# Patient Record
Sex: MALE | Race: OTHER
[De-identification: names, ages, dates, MRNs, and addresses within clinical notes are randomized per-mention and may not be internally consistent; named-entity substitution may affect disease eponyms.]

---

## 2018-11-29 ENCOUNTER — HOSPITAL ENCOUNTER (INPATIENT)
Dept: HOSPITAL 54 - ER | Age: 33
LOS: 1 days | Discharge: HOME | DRG: 343 | End: 2018-11-30
Attending: NURSE PRACTITIONER | Admitting: NURSE PRACTITIONER
Payer: COMMERCIAL

## 2018-11-29 VITALS — WEIGHT: 187 LBS | BODY MASS INDEX: 26.18 KG/M2 | HEIGHT: 71 IN

## 2018-11-29 DIAGNOSIS — K35.30: Primary | ICD-10-CM

## 2018-11-29 DIAGNOSIS — F90.9: ICD-10-CM

## 2018-11-29 LAB
ALBUMIN SERPL BCP-MCNC: 4.3 G/DL (ref 3.4–5)
ALP SERPL-CCNC: 110 U/L (ref 46–116)
ALT SERPL W P-5'-P-CCNC: 61 U/L (ref 12–78)
AST SERPL W P-5'-P-CCNC: 19 U/L (ref 15–37)
BASOPHILS # BLD AUTO: 0.1 /CMM (ref 0–0.2)
BASOPHILS NFR BLD AUTO: 0.5 % (ref 0–2)
BILIRUB DIRECT SERPL-MCNC: 0.2 MG/DL (ref 0–0.2)
BILIRUB SERPL-MCNC: 1 MG/DL (ref 0.2–1)
BILIRUB UR QL STRIP: (no result)
BUN SERPL-MCNC: 10 MG/DL (ref 7–18)
CALCIUM SERPL-MCNC: 8.7 MG/DL (ref 8.5–10.1)
CHLORIDE SERPL-SCNC: 103 MMOL/L (ref 98–107)
CO2 SERPL-SCNC: 29 MMOL/L (ref 21–32)
CREAT SERPL-MCNC: 1.1 MG/DL (ref 0.6–1.3)
EOSINOPHIL NFR BLD AUTO: 1.4 % (ref 0–6)
GLUCOSE SERPL-MCNC: 86 MG/DL (ref 74–106)
HCT VFR BLD AUTO: 46 % (ref 39–51)
HGB BLD-MCNC: 16.8 G/DL (ref 13.5–17.5)
LYMPHOCYTES NFR BLD AUTO: 19 % (ref 20–44)
LYMPHOCYTES NFR BLD AUTO: 2.1 /CMM (ref 0.8–4.8)
MCHC RBC AUTO-ENTMCNC: 37 G/DL (ref 31–36)
MCV RBC AUTO: 90 FL (ref 80–96)
MONOCYTES NFR BLD AUTO: 1 /CMM (ref 0.1–1.3)
MONOCYTES NFR BLD AUTO: 9.5 % (ref 2–12)
NEUTROPHILS # BLD AUTO: 7.6 /CMM (ref 1.8–8.9)
NEUTROPHILS NFR BLD AUTO: 69.6 % (ref 43–81)
PH UR STRIP: 7.5 [PH] (ref 5–8)
PLATELET # BLD AUTO: 236 /CMM (ref 150–450)
POTASSIUM SERPL-SCNC: 3.8 MMOL/L (ref 3.5–5.1)
PROT SERPL-MCNC: 8 G/DL (ref 6.4–8.2)
RBC # BLD AUTO: 5.15 MIL/UL (ref 4.5–6)
RBC #/AREA URNS HPF: (no result) /HPF (ref 0–2)
SODIUM SERPL-SCNC: 140 MMOL/L (ref 136–145)
UROBILINOGEN UR STRIP-MCNC: 1 EU/DL
WBC #/AREA URNS HPF: (no result) /HPF (ref 0–3)
WBC NRBC COR # BLD AUTO: 10.9 K/UL (ref 4.3–11)

## 2018-11-29 PROCEDURE — A6402 STERILE GAUZE <= 16 SQ IN: HCPCS

## 2018-11-29 PROCEDURE — G0378 HOSPITAL OBSERVATION PER HR: HCPCS

## 2018-11-29 PROCEDURE — A4606 OXYGEN PROBE USED W OXIMETER: HCPCS

## 2018-11-29 PROCEDURE — Z7610: HCPCS

## 2018-11-29 PROCEDURE — C9113 INJ PANTOPRAZOLE SODIUM, VIA: HCPCS

## 2018-11-29 RX ADMIN — HYDROMORPHONE HYDROCHLORIDE PRN MG: 2 INJECTION, SOLUTION INTRAMUSCULAR; INTRAVENOUS; SUBCUTANEOUS at 22:54

## 2018-11-29 NOTE — NUR
RN ADMITTING NOTES



PATIENT BROUGHT INTO THE UNIT VIA GURNEY, ALERT AND ORIENTED X 4, AMBULATORY. PT NOTED WITH 
NO SOB, BREATHING EVEN AND UNLABORED, DENIES NAUSEA, NO EPISODES OF VOMITING THROUGHOUT THE 
DAY, NO DIZZINESS. PT WITH C/O ABDOMINAL PAIN, 6/10 AT THIS TIME. PT VERBALIZED THAT HE 
WOULD LIKE TO EAT FIRST BEFORE GETTING ANY PAIN MEDICATION. ORIENTED PT TO UNIT, ADMISSION 
PROCESS, CALL LIGHT AND USE OF CALL LIGHT AND PT VERBALIZED UNDERSTANDING. PRE-OP TEACHING 
GIVEN, PT UNDERSTANDS AND SAID HE SPOKE WITH THE DOCTOR WHILE HE WAS IN THE ER. ALL 
PATIENT'S NEEDS ATTENDED TO AT THIS TIME. REFUSED FLU VACCINATION. PLACED CALL LIGHT WITHIN 
EASY REACH, BED IN LOW POSITION AND LOCKED IN PLACE. PT WITH GOOD SAFETY AWARENESS. WILL 
CONTINUE TO MONITOR.

## 2018-11-30 VITALS — SYSTOLIC BLOOD PRESSURE: 111 MMHG | DIASTOLIC BLOOD PRESSURE: 68 MMHG

## 2018-11-30 VITALS — SYSTOLIC BLOOD PRESSURE: 110 MMHG | DIASTOLIC BLOOD PRESSURE: 75 MMHG

## 2018-11-30 VITALS — DIASTOLIC BLOOD PRESSURE: 72 MMHG | SYSTOLIC BLOOD PRESSURE: 112 MMHG

## 2018-11-30 VITALS — DIASTOLIC BLOOD PRESSURE: 70 MMHG | SYSTOLIC BLOOD PRESSURE: 110 MMHG

## 2018-11-30 VITALS — SYSTOLIC BLOOD PRESSURE: 101 MMHG | DIASTOLIC BLOOD PRESSURE: 77 MMHG

## 2018-11-30 VITALS — DIASTOLIC BLOOD PRESSURE: 68 MMHG | SYSTOLIC BLOOD PRESSURE: 110 MMHG

## 2018-11-30 VITALS — SYSTOLIC BLOOD PRESSURE: 114 MMHG | DIASTOLIC BLOOD PRESSURE: 70 MMHG

## 2018-11-30 VITALS — DIASTOLIC BLOOD PRESSURE: 69 MMHG | SYSTOLIC BLOOD PRESSURE: 112 MMHG

## 2018-11-30 VITALS — DIASTOLIC BLOOD PRESSURE: 68 MMHG | SYSTOLIC BLOOD PRESSURE: 111 MMHG

## 2018-11-30 VITALS — DIASTOLIC BLOOD PRESSURE: 72 MMHG | SYSTOLIC BLOOD PRESSURE: 119 MMHG

## 2018-11-30 VITALS — SYSTOLIC BLOOD PRESSURE: 114 MMHG | DIASTOLIC BLOOD PRESSURE: 69 MMHG

## 2018-11-30 LAB
ALBUMIN SERPL BCP-MCNC: 3.5 G/DL (ref 3.4–5)
ALP SERPL-CCNC: 90 U/L (ref 46–116)
ALT SERPL W P-5'-P-CCNC: 50 U/L (ref 12–78)
AST SERPL W P-5'-P-CCNC: 18 U/L (ref 15–37)
BASOPHILS # BLD AUTO: 0 /CMM (ref 0–0.2)
BASOPHILS NFR BLD AUTO: 0.3 % (ref 0–2)
BILIRUB DIRECT SERPL-MCNC: 0.1 MG/DL (ref 0–0.2)
BILIRUB SERPL-MCNC: 0.9 MG/DL (ref 0.2–1)
BUN SERPL-MCNC: 10 MG/DL (ref 7–18)
CALCIUM SERPL-MCNC: 8.6 MG/DL (ref 8.5–10.1)
CHLORIDE SERPL-SCNC: 104 MMOL/L (ref 98–107)
CO2 SERPL-SCNC: 27 MMOL/L (ref 21–32)
CREAT SERPL-MCNC: 1.1 MG/DL (ref 0.6–1.3)
EOSINOPHIL NFR BLD AUTO: 1.3 % (ref 0–6)
GLUCOSE SERPL-MCNC: 117 MG/DL (ref 74–106)
HCT VFR BLD AUTO: 42 % (ref 39–51)
HGB BLD-MCNC: 14.8 G/DL (ref 13.5–17.5)
LYMPHOCYTES NFR BLD AUTO: 1.4 /CMM (ref 0.8–4.8)
LYMPHOCYTES NFR BLD AUTO: 13.5 % (ref 20–44)
MAGNESIUM SERPL-MCNC: 1.8 MG/DL (ref 1.8–2.4)
MCHC RBC AUTO-ENTMCNC: 36 G/DL (ref 31–36)
MCV RBC AUTO: 91 FL (ref 80–96)
MONOCYTES NFR BLD AUTO: 1.2 /CMM (ref 0.1–1.3)
MONOCYTES NFR BLD AUTO: 11.4 % (ref 2–12)
NEUTROPHILS # BLD AUTO: 7.5 /CMM (ref 1.8–8.9)
NEUTROPHILS NFR BLD AUTO: 73.5 % (ref 43–81)
PHOSPHATE SERPL-MCNC: 2.8 MG/DL (ref 2.5–4.9)
PLATELET # BLD AUTO: 205 /CMM (ref 150–450)
POTASSIUM SERPL-SCNC: 3.7 MMOL/L (ref 3.5–5.1)
PROT SERPL-MCNC: 6.9 G/DL (ref 6.4–8.2)
RBC # BLD AUTO: 4.59 MIL/UL (ref 4.5–6)
SODIUM SERPL-SCNC: 139 MMOL/L (ref 136–145)
WBC NRBC COR # BLD AUTO: 10.1 K/UL (ref 4.3–11)

## 2018-11-30 PROCEDURE — 0DTJ4ZZ RESECTION OF APPENDIX, PERCUTANEOUS ENDOSCOPIC APPROACH: ICD-10-PCS | Performed by: SURGERY

## 2018-11-30 RX ADMIN — HYDROMORPHONE HYDROCHLORIDE PRN MG: 2 INJECTION, SOLUTION INTRAMUSCULAR; INTRAVENOUS; SUBCUTANEOUS at 05:22

## 2018-11-30 RX ADMIN — HYDROMORPHONE HYDROCHLORIDE PRN MG: 2 INJECTION, SOLUTION INTRAMUSCULAR; INTRAVENOUS; SUBCUTANEOUS at 09:41

## 2018-11-30 NOTE — NUR
RN NOTES:

PATIENT IS NOW READY FOR DISCHARGE, IV CANNULA REMOVED ON THE RAC, NO BLEEDING NOTED, 
SECURED WITH DRESSING, DISCHARGE PAPER 3 PAGES WERE ALL SIGNED, COPY GIVEN TO PATIENT, 
BELONGINGS CHECKED AND COMPLETED BY MORNING SHIFT CNA, SIGNED BY PATIENT.LATEST V/S  
BP-110/75 ND-82 RR-18 SPO2 96% ROOM AIR, ACCOMPANIED BY CNA VIA WHEELCHAIR WITH 2 RELATIVES 
GOING DOWN FOR DISCHARGE AT 2000.

## 2018-11-30 NOTE — NUR
MS RN NOTES



PATIENT CAME BACK FROM OR AT 12:45 S/P LAP APPENDECTOMY. PATIENT IS AWAKE, CALM AND 
RESPONSIVE TO BOTH TACTILE AND VERBAL STIMULI. VITAL SIGNS CHECKED AND RECORDED;/72, 
HR81, RR17, TEMP98.5F/AXILLA. PATIENT IS ON SUPPLEMENTAL O2 AT 2LPM VIA NC TOLERATING WELL. 
NO SIGNS OF ACUTE RESPIRATORY DISTRESS NOTED. PATIENT WITH 3 INCISSION SITES #1 MID ABDOMEN, 
#2 MID LOWER ABDOMEN, #3 LEFT LOWER  QUADRANT, ALL DRESSINGS CLEAN, DRY, AND INTACT. NO 
ACTIVE BLEEDING NOTED. WILL CONTINUE TO MONITOR.

## 2018-11-30 NOTE — NUR
MS RN CLOSING NOTES



PATIENT IS FOR DISCHARGE TONIGHT AS ORDERED. NO COMPLAINS OF PAIN AT THIS TIME. PATIENT WAS 
ABLE TO CONSUMED FOOD TRAY WITHOUT NAUSEA AND VOMITING AND ABDOMINAL PAIN. . VOIDING FREELY. 
SIGNIFICANT OTHERS AT BEDSIDE. KEPT CLEAN, DRY AND COMFORTABLE. NO SIGNS OF ACUTE 
RESPIRATORY DISTRESS. ASLEEP AT THIS TIME BUT EASILY AROUSABLE. ALL NEEDS ATTENDED. 
DISCHARGE INSTRUCTION GIVEN TO PATIENT AND SIGNIFICANT OTHER, VERBALIZES UNDERSTANDING. WILL 
ENDORSE TO NIGHT SHIFT NURSE.

## 2018-11-30 NOTE — NUR
RN NOTES



ABLE TO WALK IN THE HALLWAY WITH THE GIRLFRIEND, TOLERATING ACTIVITY WELL. NO SIGNS OF ACUTE 
RESPIRATORY DISTRESS. SECURED DRESSING, KEPT CLEAN, DRY AND INTACT.



PATIENT COMPLAINED OF PAIN AROUND THE INCISION SITE AT 17:49 DILAUDID GIVEN AS ORDERED FOR 
8/10 PAIN SCALE. KEPT RESTED. WILL CONTINUE TO MONITOR PATIENT AT THIS TIME.

## 2018-11-30 NOTE — NUR
MS RN NOTES



PATIENT TRANSPORTED TO OR FOR LAP APPENDECTOMY BY OR STAFF VIA HIS BED. PATIENT APPEARS 
STABLE, NO SIGNS OF ANY ACUTE DISTRESS. AWAKE, ALERT, ORIENTED AND CALM.

## 2018-11-30 NOTE — NUR
RN OPENING NOTES:

RECEIVED PATIENT AWAKE LYING ON BED COMFORTABLE, A/OX4,NO PAIN OR DISCOMFORT, IVF ONGOING, 
AWAITING FOR DISCHARGE, PAPER WORKS ARE ALL READY, DISCHARGE MEDICATION WERE ALREADY 
EXPLAINED BY MORNING SHIFT RN. RELATIVE PRESENT AT BED SIDE PACKING HIS THINGS READY.

## 2018-11-30 NOTE — NUR
RN CLOSING NOTES

PATIENT SITTING UP IN BED, ASLEEP BUT EASILY AROUSABLE. IN NO ACUTE DISTRESS, ALL DUE 
MEDICATION GIVEN. PT IS NPO AT THIS TIME, IVF INFUSING WELL AS ORDERED VIA IVP ON RAC G#20, 
INTACT AND PATENT. ALL PATIENT'S NEEDS ATTENDED TO. CALL LIGHT PLACED WITHIN EASY REACH. 
WILL ENDORSE TO AM SHIFT NURSE FOR CONTINUITY OF CARE.

## 2018-11-30 NOTE — NUR
MS RN OPENING NOTES



RECEIVED PATIENT AWAKE IN BED ALERT ORIENTED X 4. NO SIGNS OF ACUTE RESPIRATORY DISTRESS, NO 
SIGNS AND SYMPTOMS OF PAIN AND DISCOMFORT. NPO MAINTAINED AS PER MD ORDERED, PATIENT IS FOR 
APPENDECTOMY TODAY 11/30/18. IV ACCESS ON HIS RIGHT ANTECUBITAL WITH D51/2NS AT 75ML/HR. BED 
SIDE RAILS UP X 3, BED IN LOW POSITION, SAFETY MEASURES MAINTAINED. KEPT CLEAN, DRY AND 
COMFORTABLE. WILL CONTINUE TO MONITOR ACCORDINGLY.

## 2018-11-30 NOTE — NUR
MS RN NOTES



MONITORED VITAL SIGNS S/P LAP APPENDECTOMY AT 13:00 /72; HR94; RR17; TEMP97.8. AT 13:15 
/77; HR98; RR17; TEMP99.3. PATIENT COMPLAINT OF PAIN AND DISCOMFORT AT 13:21 PERCOCET 
GIVEN AS ORDERED. PATIENT ASSISTED  TO THE BATHROOM, VOIDING FREELY. SAFETY MEASURES 
OBSERVED AT ALL TIME.



AT 13:30 /69;HR89; RR18; TEMP99.0. AT 14:00 /75; HR98;RR18;TEMP99.3. AT 14:30 BP 
112/69;HR97;RR18;TEMP99.0. 



WILL CONTINUE TO MONITOR PATIENT.